# Patient Record
Sex: FEMALE | Race: WHITE | NOT HISPANIC OR LATINO | Employment: UNEMPLOYED | URBAN - NONMETROPOLITAN AREA
[De-identification: names, ages, dates, MRNs, and addresses within clinical notes are randomized per-mention and may not be internally consistent; named-entity substitution may affect disease eponyms.]

---

## 2017-07-19 ENCOUNTER — TRANSCRIBE ORDERS (OUTPATIENT)
Dept: ADMINISTRATIVE | Facility: HOSPITAL | Age: 22
End: 2017-07-19

## 2017-07-19 DIAGNOSIS — Z13.89 ENCOUNTER FOR SCREENING FOR OTHER DISORDER: Primary | ICD-10-CM

## 2018-03-19 ENCOUNTER — HOSPITAL ENCOUNTER (OUTPATIENT)
Dept: GENERAL RADIOLOGY | Facility: HOSPITAL | Age: 23
Discharge: HOME | End: 2018-03-19
Admitting: NURSE PRACTITIONER

## 2018-03-19 ENCOUNTER — TRANSCRIBE ORDERS (OUTPATIENT)
Dept: ADMINISTRATIVE | Facility: HOSPITAL | Age: 23
End: 2018-03-19

## 2018-03-19 DIAGNOSIS — S89.92XA INJURY OF LEFT LOWER EXTREMITY, INITIAL ENCOUNTER: Primary | ICD-10-CM

## 2018-03-19 PROCEDURE — 73590 X-RAY EXAM OF LOWER LEG: CPT

## 2020-01-07 ENCOUNTER — TELEPHONE (OUTPATIENT)
Dept: INTERNAL MEDICINE | Facility: CLINIC | Age: 25
End: 2020-01-07

## 2020-01-07 NOTE — TELEPHONE ENCOUNTER
Pt says her parents are Job and Katt Hernandez and are patients of Dr. Kwong.  Pt says Katt spoke to Dr. Kwong and he agreed to see pt.  If Dr. Kwong does agree to accept pt please have someone in the office set up appt as I am unable to book a new patient with Dr. Kwong.

## 2020-02-12 ENCOUNTER — OFFICE VISIT (OUTPATIENT)
Dept: INTERNAL MEDICINE | Facility: CLINIC | Age: 25
End: 2020-02-12

## 2020-02-12 VITALS
WEIGHT: 158 LBS | HEART RATE: 72 BPM | SYSTOLIC BLOOD PRESSURE: 114 MMHG | HEIGHT: 64 IN | RESPIRATION RATE: 16 BRPM | TEMPERATURE: 98.3 F | BODY MASS INDEX: 26.98 KG/M2 | DIASTOLIC BLOOD PRESSURE: 60 MMHG

## 2020-02-12 DIAGNOSIS — Z01.419 ENCOUNTER FOR WELL WOMAN EXAM WITH ROUTINE GYNECOLOGICAL EXAM: ICD-10-CM

## 2020-02-12 DIAGNOSIS — Z00.00 PHYSICAL EXAM: Primary | ICD-10-CM

## 2020-02-12 DIAGNOSIS — Z23 IMMUNIZATION DUE: ICD-10-CM

## 2020-02-12 DIAGNOSIS — L68.9 EXCESS BODY AND FACIAL HAIR: ICD-10-CM

## 2020-02-12 LAB
BILIRUB BLD-MCNC: NEGATIVE MG/DL
CLARITY, POC: CLEAR
COLOR UR: YELLOW
EXPIRATION DATE: ABNORMAL
GLUCOSE UR STRIP-MCNC: NEGATIVE MG/DL
KETONES UR QL: NEGATIVE
LEUKOCYTE EST, POC: NEGATIVE
Lab: ABNORMAL
NITRITE UR-MCNC: NEGATIVE MG/ML
PH UR: 7 [PH] (ref 5–8)
PROT UR STRIP-MCNC: NEGATIVE MG/DL
RBC # UR STRIP: ABNORMAL /UL
SP GR UR: 1 (ref 1–1.03)
UROBILINOGEN UR QL: NORMAL

## 2020-02-12 PROCEDURE — 81003 URINALYSIS AUTO W/O SCOPE: CPT | Performed by: INTERNAL MEDICINE

## 2020-02-12 PROCEDURE — 99385 PREV VISIT NEW AGE 18-39: CPT | Performed by: INTERNAL MEDICINE

## 2020-02-12 NOTE — PROGRESS NOTES
Chief Complaint   Patient presents with   • New patient, Establish care   • Annual Exam       History of Present Illness      The patient presents for an established patient physical examination and health maintenance visit.    The patient presents for a yearly gynecologic exam. She is G 0 P 0 Ab 0 SpAb 0 LC 0. She has never had a sexually transmitted disease. She denies vaginal discharge and she denies dyspareunia, dysmenorrhea or menorrhagia. She does not do monthly breast self examinations. She has not had a prior mammogram. She has not had a DEXA scan. She takes adequate Calcium and Vitamin D. She has not had a colonoscopy.    Review of Systems    CONSTITUTIONAL- Denies Unexplained Weight Loss, Fever, Chills, Sweats, Fatigue, Weakness or Malaise.    NECK- Denies Decreased Range of Motion, Stiffness, Thyroid Nodules, Enlarged Lymph Nodes or Goiter.    EYES- Denies Eye Pain, Eye Drainage, Eye Redness, Eye Discharge, Decreased Vision, Visual Disturbance, Diplopia, Visual Loss or Swollen Eyelid.    HENT- Denies Nasal Discharge, Sore Throat, Ear Pain, Ear Drainage, Headache, Sinus Pain, Nasal Congestion, Decreased Hearing or Tinnitus.    GASTROINTESTINAL- Denies Abdominal Pain, Nausea, Vomiting, Diarrhea, Blood per Rectum, Constipation or Heartburn.    PULMONARY- Denies Wheezing, Dyspnea, Sputum Production, Cough, Hemoptysis or Pleuritic Chest Pain.    CARDIOVASCULAR- Denies Chest Pain, Claudication, Edema, Syncope, Palpitations or Irregular Heart Beat.    GENITOURINARY- Denies Dysuria, Hematuria, Urinary Frequency, Urinary Leakage, Pelvic Pain or Vaginal Prolapse.    ENDOCRINE- Denies Cold Intolerance, Depression, Hair Loss, Heat Intolerance, Memory Loss, Polydypsia, Polyphagia, Polyuria, Sleep Disturbance or Weight Gain.    NEUROLOGIC- Denies Seizures, Visual Changes, Confusion or Excessive Daytime Sleepiness.    MUSCULOSKELETAL- Denies Joint Pain, Joint Stiffness, Decreased Range of Motion, Joint Swelling or  "Erythema of Joints.    INTEGUMENTARY- Denies Rash, Lumps, Sores, Itching, Dryness, Color Change, Changes in Hair, Brittle Nails, Discolored Nails, Thickened Nails, Growths or Alopecia.    Medications    No current outpatient medications on file.     Allergies    Allergies   Allergen Reactions   • Sulfa Antibiotics Unknown - Low Severity     Childhood       Problem List    There is no problem list on file for this patient.      Medications, Allergies, Problems List and Past History were reviewed and updated.    Physical Examination    /60 (BP Location: Right arm, Patient Position: Sitting, Cuff Size: Adult)   Pulse 72   Temp 98.3 °F (36.8 °C) (Temporal)   Resp 16   Ht 161.9 cm (63.75\")   Wt 71.7 kg (158 lb)   LMP 01/16/2020 (Approximate)   Breastfeeding No   BMI 27.33 kg/m²     HEENT: Head- Normocephalic Atraumatic. Facies- Within normal limits. Pinnas- Normal texture and shape bilaterally. Canals- Normal bilaterally. TMs- Normal bilaterally. Nares- Patent bilaterally. Nasal Septum- is normal. There is no tenderness to palpation over the frontal or maxillary sinuses. Lids- Normal bilaterally. Conjunctiva- Clear bilaterally. Sclera- Anicteric bilaterally. Oropharynx- Moist with no lesions. Tonsils- No enlargement, erythema or exudate.    Neck: Thyroid- non enlarged, symmetric and has no nodules. No bruits are detected. ROM- Normal Range of Motion with no rigidity.    Lungs: Auscultation- Clear to auscultation bilaterally. There are no retractions, clubbing or cyanosis. The Expiratory to Inspiratory ratio is equal.    Lymph Nodes: Cervical- no enlarged lymph nodes noted. Clavicular- no enlarged supraclavicular lymph nodes noted. Axillary- no enlarged axillary lymph nodes noted. Inguinal- no enlarged inguinal lymph nodes noted.    Cardiovascular: There are no carotid bruits. Heart- Normal Rate with Regular rhythm and no murmurs. There are no gallops. There are no rubs. In the lower extremities there is no " edema. The upper extremities do not have edema.    Abdomen: Soft, benign, non-tender with no masses, hernias, organomegaly or scars.    Breast: Normal contours bilaterally with no masses, discharge, skin changes or lumps. There are no scars noted.    Genitourinary: The labia are within normal limits with no lesions. The vaginal introitus is within normal limits. The vagina has a healthy mucosa with no lesions or discharge. The cervix is nulliparous with no lesions. The bimanual examination reveals no adnexal masses or tenderness.    Dermatologic: The patient has no worrisome or suspicious skin lesions noted.    Impression and Assessment    Normal Physical Examination.    Normal Gynecologic Exam.    Encounter for Immunization Administration.    Plan    Normal Gynecologic Exam Plan: A Pap smear was obtained.    Counseling was provided regarding: Dental Visits, Flossing Teeth and Heart Healthy Diet.    The following was ordered for screening and health maintenance: CBC w Automated Diff, CMP, Lipid Profile, TSH and U/A.    Counseled regarding immunizations and applicable VIS given.    Immunizations Ordered and Administered: Influenza.    Pat was seen today for new patient, establish care and annual exam.    Diagnoses and all orders for this visit:    Physical exam  -     Comprehensive Metabolic Panel  -     Lipid Panel  -     CBC & Differential  -     TSH  -     Vitamin D 25 Hydroxy  -     POC Urinalysis Dipstick, Automated    Encounter for well woman exam with routine gynecological exam  -     Liquid-based Pap Smear, Screening; Future    Immunization due  -     Fluarix/Fluzone/Afluria Quad>6 Months          Return to Office    The patient was instructed to return for follow-up in 1 year. Next Visit: Physical.    The patient was instructed to return sooner if the condition changes, worsens, or does not resolve.

## 2020-06-01 RX ORDER — NORETHINDRONE ACETATE AND ETHINYL ESTRADIOL .03; 1.5 MG/1; MG/1
1 TABLET ORAL DAILY
Qty: 28 EACH | Refills: 11 | Status: SHIPPED | OUTPATIENT
Start: 2020-06-01 | End: 2020-06-03 | Stop reason: SDUPTHER

## 2020-06-02 ENCOUNTER — TELEPHONE (OUTPATIENT)
Dept: INTERNAL MEDICINE | Facility: CLINIC | Age: 25
End: 2020-06-02

## 2020-06-02 NOTE — TELEPHONE ENCOUNTER
PHARMACY IS CALLING BECAUSE THE BIRTH CONTROL IS 21 DAY BUT QUANTITY SAYS 28 SO THEY NEED CLARIFICATION ON WHICH IS CORRECT  Norethindrone Acet-Ethinyl Est (Loestrin 1.5/30, 21,) 1.5-30 MG-MCG tablet    PLEASE ADVISE     GEORGIANA MARQUEZ PH: [phone]

## 2020-06-03 RX ORDER — NORETHINDRONE ACETATE AND ETHINYL ESTRADIOL .03; 1.5 MG/1; MG/1
1 TABLET ORAL DAILY
Qty: 21 EACH | Refills: 11 | Status: SHIPPED | OUTPATIENT
Start: 2020-06-03 | End: 2020-06-10 | Stop reason: SDUPTHER

## 2020-06-10 ENCOUNTER — OFFICE VISIT (OUTPATIENT)
Dept: INTERNAL MEDICINE | Facility: CLINIC | Age: 25
End: 2020-06-10

## 2020-06-10 VITALS
RESPIRATION RATE: 16 BRPM | DIASTOLIC BLOOD PRESSURE: 64 MMHG | WEIGHT: 147 LBS | HEART RATE: 72 BPM | TEMPERATURE: 98.6 F | BODY MASS INDEX: 25.43 KG/M2 | SYSTOLIC BLOOD PRESSURE: 112 MMHG

## 2020-06-10 DIAGNOSIS — N92.6 MENSTRUAL ABNORMALITY: Primary | ICD-10-CM

## 2020-06-10 PROCEDURE — 99214 OFFICE O/P EST MOD 30 MIN: CPT | Performed by: INTERNAL MEDICINE

## 2020-06-10 RX ORDER — NORETHINDRONE ACETATE AND ETHINYL ESTRADIOL .03; 1.5 MG/1; MG/1
1 TABLET ORAL DAILY
Qty: 21 EACH | Refills: 11 | Status: SHIPPED | OUTPATIENT
Start: 2020-06-10 | End: 2020-07-11 | Stop reason: SDUPTHER

## 2020-06-10 NOTE — PROGRESS NOTES
Chief Complaint   Patient presents with   • Discuss birth control and hormones       History of Present Illness    The patient presents for evaluation of abnormal uterine bleeding. Her menses are usually irregular. Her menses are now varying between long and short cycles and heavy and light bleeding.. She is not currently menstruating. The patient is sexually active by her report. She is using contraceptives.       She denies excessive hair loss, visual changes, headaches, constipation, heat intolerance, cold intolerance, nausea, breast tenderness or pelvic pain. She notes facial hair on her chin and hair on her abdomen. She has occasional acne. She denies being depressed or under increased stress. She is not on phenothiazine medications. She has never had radiation therapy in the past. She has never had a dilatation and curettage. She has never had a sexually transmitted disease, endometritis or pelvic inflammatory disease.    Review of Systems    PULMONARY- Denies Wheezing, Dyspnea, Sputum Production, Cough, Hemoptysis or Pleuritic Chest Pain.    GASTROINTESTINAL- Denies Abdominal Pain, Vomiting, Diarrhea, Blood per Rectum or Heartburn.    CARDIOVASCULAR- Denies Chest Pain, Claudication, Edema, Syncope, Palpitations or Irregular Heart Beat.    ENDOCRINE- Denies Fatigue, Memory Loss, Polydypsia, Polyphagia, Polyuria, Sleep Disturbance, Weight Gain or Weight Loss.    Medications      Current Outpatient Medications:   •  Norethindrone Acet-Ethinyl Est (Loestrin 1.5, ,) 1.5-30 MG-MCG tablet, Take 1 tablet by mouth Daily., Disp: 21 each, Rfl: 11  Not currently taking- rx .)     Allergies    Allergies   Allergen Reactions   • Sulfa Antibiotics Unknown - Low Severity     Childhood       Problem List    There is no problem list on file for this patient.      Medications, Allergies, Problems List and Past History were reviewed and updated.    Physical Examination    /64 (BP Location: Right arm, Patient  Position: Sitting, Cuff Size: Adult)   Pulse 72   Temp 98.6 °F (37 °C) (Temporal)   Resp 16   Wt 66.7 kg (147 lb)   LMP 04/20/2020 (Approximate)   Breastfeeding No   BMI 25.43 kg/m²     HEENT: Head- Normocephalic Atraumatic. Facies- Within normal limits. Pinnas- Normal texture and shape bilaterally. Canals- Normal bilaterally. TMs- Normal bilaterally. Nares- Patent bilaterally. Nasal Septum- is normal. Lids- Normal bilaterally. Conjunctiva- Clear bilaterally. Sclera- Anicteric bilaterally. Oropharynx- Moist with no lesions.    Neck: Thyroid- non enlarged, symmetric and has no nodules. No bruits are detected. ROM- Normal Range of Motion with no rigidity.    Lungs: Auscultation- Clear to auscultation bilaterally. There are no retractions, clubbing or cyanosis. The Expiratory to Inspiratory ratio is equal.    Cardiovascular: Heart- Normal Rate with Regular rhythm and no murmurs.    Abdomen: Soft, benign, non-tender with no masses, hernias, organomegaly or scars.    Impression and Assessment    Menorrhagia.    Plan    Menorrhagia Plan: Instruction regarding OCP use were given. Instructions were given regarding what to do if an OCP dose is missed. The risks, including blood clots and strokes, of using oral contraceptives with tobacco were discussed. A new rx for OCPs was provided.    Pat was seen today for discuss birth control and hormones.    Diagnoses and all orders for this visit:    Menstrual abnormality  -     Estradiol; Future  -     Luteinizing Hormone; Future  -     Follicle Stimulating Hormone; Future  -     TSH; Future  -     T4, Free; Future  -     US Non-ob Transvaginal; Future    Other orders  -     Norethindrone Acet-Ethinyl Est (Loestrin 1.5/30, 21,) 1.5-30 MG-MCG tablet; Take 1 tablet by mouth Daily.        Return to Office    The patient was instructed to return for follow-up in 1 year.    The patient was instructed to return sooner if the condition changes, worsens, or does not resolve.

## 2020-06-11 ENCOUNTER — TELEPHONE (OUTPATIENT)
Dept: INTERNAL MEDICINE | Facility: CLINIC | Age: 25
End: 2020-06-11

## 2020-06-11 NOTE — TELEPHONE ENCOUNTER
AUTUMN NEEDS CLARIFICATION FOR  Norethindrone Acet-Ethinyl Est (Loestrin 1.5/30, 21,) 1.5-30 MG-MCG tablet    AUTUMN CALL BACK [phone]

## 2020-06-11 NOTE — TELEPHONE ENCOUNTER
[facility] [phone]  Confirmed prescription should be for the 21 day Rx. Good verbal understanding.

## 2020-06-19 ENCOUNTER — HOSPITAL ENCOUNTER (OUTPATIENT)
Dept: ULTRASOUND IMAGING | Facility: HOSPITAL | Age: 25
Discharge: HOME | End: 2020-06-19
Admitting: INTERNAL MEDICINE

## 2020-06-19 DIAGNOSIS — N92.6 MENSTRUAL ABNORMALITY: ICD-10-CM

## 2020-06-19 PROCEDURE — 76830 TRANSVAGINAL US NON-OB: CPT

## 2020-07-11 RX ORDER — NORETHINDRONE ACETATE AND ETHINYL ESTRADIOL .03; 1.5 MG/1; MG/1
1 TABLET ORAL DAILY
Qty: 21 EACH | Refills: 11 | OUTPATIENT
Start: 2020-07-11 | End: 2021-01-17

## 2020-08-22 DIAGNOSIS — E66.3 OVERWEIGHT: ICD-10-CM

## 2020-08-22 DIAGNOSIS — L68.9 EXCESS BODY AND FACIAL HAIR: Primary | ICD-10-CM

## 2021-01-18 ENCOUNTER — OFFICE VISIT (OUTPATIENT)
Dept: INTERNAL MEDICINE | Facility: CLINIC | Age: 26
End: 2021-01-18

## 2021-01-18 DIAGNOSIS — N76.0 ACUTE VAGINITIS: Primary | ICD-10-CM

## 2021-01-18 DIAGNOSIS — R31.9 URINARY TRACT INFECTION WITH HEMATURIA, SITE UNSPECIFIED: ICD-10-CM

## 2021-01-18 DIAGNOSIS — N39.0 URINARY TRACT INFECTION WITH HEMATURIA, SITE UNSPECIFIED: ICD-10-CM

## 2021-01-18 PROCEDURE — 99441 PR PHYS/QHP TELEPHONE EVALUATION 5-10 MIN: CPT | Performed by: INTERNAL MEDICINE

## 2021-01-18 NOTE — PROGRESS NOTES
Chief Complaint   Patient presents with   • Vaginitis   • Urinary Tract Infection     You have chosen to receive care through a telephone visit. Do you consent to use a telephone visit for your medical care today? Yes  Total time of call:  6.5 minutes.    History of Present Illness    Patient presents for a telephone visit to discuss lab tests performed yesterday.  She was seen at the [facility] in [place] for Strep throat and was placed on augmentin.  She states that she also has vaginal discharge and dysuria.  Cultures were obtained.  Vaginal swabs were performed.  She denies fevers, chills.  Has had a few sweats.  Symptoms present for 5 days.  Denies abdominal pain.      Diagnoses and all orders for this visit:    1. Acute vaginitis (Primary)    2. Urinary tract infection with hematuria, site unspecified    Will await culture results.  Continue augmentin.  Call if changes, worsens.      Answers for HPI/ROS submitted by the patient on 1/18/2021   Female genitourinary complaint  What is the primary reason for your visit?: Vaginal Discharge/Irritation  genital itching: No  genital lesions: No  genital odor: Yes  genital rash: No  missed menses: No  pelvic pain: No  vaginal bleeding: Yes  vaginal discharge: Yes  Chronicity: new  Onset: in the past 7 days  Frequency: rarely  Progression since onset: gradually improving  Severity of pain: moderate  Affected side: both  Pregnant now?: No  abdominal pain: No  anorexia: No  back pain: Yes  chills: No  constipation: No  diarrhea: No  discolored urine: Yes  dysuria: Yes  fever: No  flank pain: No  frequency: Yes  headaches: No  hematuria: No  joint pain: No  joint swelling: Yes  nausea: Yes  painful intercourse: Yes  rash: No  sore throat: Yes  urgency: No  vomiting: No  Aggravated by: urinating  Sexual activity: sexually active  Partner with STD symptoms: no  Birth control: nothing  Menstrual history: irregular  Vaginal bleeding: typical of menses  Passing clots?: Yes  Passing  tissue?: No  Discharge characteristics: malodorous, mucoid, thick, yellow

## 2021-01-20 ENCOUNTER — TELEPHONE (OUTPATIENT)
Dept: URGENT CARE | Facility: CLINIC | Age: 26
End: 2021-01-20

## 2021-01-20 DIAGNOSIS — A54.9 GONORRHEA: Primary | ICD-10-CM

## 2021-01-20 RX ORDER — AZITHROMYCIN 500 MG/1
TABLET, FILM COATED ORAL
Qty: 2 TABLET | Refills: 0 | OUTPATIENT
Start: 2021-01-20 | End: 2021-06-16

## 2021-06-10 ENCOUNTER — TELEPHONE (OUTPATIENT)
Dept: INTERNAL MEDICINE | Facility: CLINIC | Age: 26
End: 2021-06-10

## 2021-06-10 NOTE — TELEPHONE ENCOUNTER
"LMVM for pt that we were returning her call and to please call back.  Ofc # given.     HUB:  Please inform pt that Dr Kate said  \"We can do that here as long as not on face/eyes.\"  She can schedule appt for this as long as they are not on her face/eyes.    Document if notified.   "

## 2021-06-10 NOTE — TELEPHONE ENCOUNTER
Caller: Pat Chapman    Relationship to patient: Self    Best call back number: [phone]    Patient is needing: PATIENT WAS ASKING TO SEE IF SHE COULD GET SKIN TAGS REMOVED IN THE OFFICE OR WOULD SHE HAVE TO SEE A DERMATOLOGIST FOR THAT    PLEASE ADVISE

## 2021-07-27 ENCOUNTER — TELEPHONE (OUTPATIENT)
Dept: INTERNAL MEDICINE | Facility: CLINIC | Age: 26
End: 2021-07-27

## 2021-07-27 ENCOUNTER — OFFICE VISIT (OUTPATIENT)
Dept: INTERNAL MEDICINE | Facility: CLINIC | Age: 26
End: 2021-07-27

## 2021-07-27 VITALS
DIASTOLIC BLOOD PRESSURE: 56 MMHG | TEMPERATURE: 97.8 F | BODY MASS INDEX: 23.17 KG/M2 | WEIGHT: 135 LBS | RESPIRATION RATE: 16 BRPM | HEART RATE: 84 BPM | SYSTOLIC BLOOD PRESSURE: 100 MMHG

## 2021-07-27 DIAGNOSIS — K64.9 HEMORRHOIDS, UNSPECIFIED HEMORRHOID TYPE: Primary | ICD-10-CM

## 2021-07-27 PROCEDURE — 99212 OFFICE O/P EST SF 10 MIN: CPT | Performed by: INTERNAL MEDICINE

## 2021-07-27 NOTE — TELEPHONE ENCOUNTER
Caller: Pat Chapman    Relationship to patient: Self    Best call back number:[phone]    PATIENT CALLING IN TO ASK IF SHE CAN STILL HAVE THE SKIN TAG REMOVAL TODAY? IT IS LOCATED ON THE ANUS AREA. PLEASE CALL AND ADVISE -[phone]

## 2021-07-27 NOTE — PROGRESS NOTES
Chief Complaint   Patient presents with   • SKIN TAG REMOVAL       History of Present Illness    The patient reports a chronic history of painful hemorrhoids. The hemorrhoids are not swollen. There has not been bleeding from the rectum. These factors have not given relief of the symptoms. She states that she is not constipated.    Review of Systems    GASTROINTESTINAL- Denies Abdominal Pain, Nausea, Vomiting, Diarrhea or Heartburn.    Medications      Current Outpatient Medications:   •  ondansetron (ZOFRAN) 4 MG tablet, Take 1 tablet by mouth Every 8 (Eight) Hours As Needed for Nausea or Vomiting., Disp: 12 tablet, Rfl: 0     Allergies    Allergies   Allergen Reactions   • Sulfa Antibiotics Unknown - Low Severity     Childhood       Problem List    There is no problem list on file for this patient.      Medications, Allergies, Problems List and Past History were reviewed and updated.    Physical Examination    /56 (BP Location: Right arm, Patient Position: Sitting, Cuff Size: Adult)   Pulse 84   Temp 97.8 °F (36.6 °C) (Infrared)   Resp 16   Wt 61.2 kg (135 lb)   LMP 07/13/2021 (Approximate)   Breastfeeding No   BMI 23.17 kg/m²     Rectal: reveals normal external sphincter tone with non-thrombosed external hemorrhoids.    Impression and Assessment     Hemorrhoids.    Plan    Hemorrhoids Plan: She was referred to surgery.    Diagnoses and all orders for this visit:    1. Hemorrhoids, unspecified hemorrhoid type (Primary)  -     Ambulatory Referral to Colorectal Surgery          Return to Office    The patient was instructed to return for follow-up as needed. The patient was instructed to return sooner if the condition changes, worsens, or does not resolve.

## 2021-09-29 PROCEDURE — U0004 COV-19 TEST NON-CDC HGH THRU: HCPCS | Performed by: FAMILY MEDICINE

## 2021-10-01 ENCOUNTER — TELEPHONE (OUTPATIENT)
Dept: URGENT CARE | Facility: CLINIC | Age: 26
End: 2021-10-01

## 2022-01-17 PROCEDURE — U0004 COV-19 TEST NON-CDC HGH THRU: HCPCS | Performed by: FAMILY MEDICINE

## 2022-07-06 PROCEDURE — U0004 COV-19 TEST NON-CDC HGH THRU: HCPCS | Performed by: NURSE PRACTITIONER

## 2022-07-09 ENCOUNTER — TELEPHONE (OUTPATIENT)
Dept: URGENT CARE | Facility: CLINIC | Age: 27
End: 2022-07-09

## 2022-07-09 DIAGNOSIS — J02.0 STREP THROAT: Primary | ICD-10-CM

## 2022-07-09 RX ORDER — AMOXICILLIN 875 MG/1
TABLET, COATED ORAL
Qty: 20 TABLET | Refills: 0 | OUTPATIENT
Start: 2022-07-09 | End: 2023-01-06

## 2023-09-15 DIAGNOSIS — J02.0 STREP PHARYNGITIS: Primary | ICD-10-CM

## 2023-09-15 RX ORDER — AMOXICILLIN 500 MG/1
CAPSULE ORAL
Qty: 20 CAPSULE | Refills: 0 | Status: SHIPPED | OUTPATIENT
Start: 2023-09-15

## 2024-01-17 ENCOUNTER — OFFICE VISIT (OUTPATIENT)
Dept: INTERNAL MEDICINE | Facility: CLINIC | Age: 29
End: 2024-01-17
Payer: COMMERCIAL

## 2024-01-17 VITALS
HEIGHT: 64 IN | BODY MASS INDEX: 25.44 KG/M2 | HEART RATE: 76 BPM | SYSTOLIC BLOOD PRESSURE: 110 MMHG | RESPIRATION RATE: 18 BRPM | DIASTOLIC BLOOD PRESSURE: 70 MMHG | WEIGHT: 149 LBS | TEMPERATURE: 97.5 F

## 2024-01-17 DIAGNOSIS — J02.0 STREP PHARYNGITIS: ICD-10-CM

## 2024-01-17 DIAGNOSIS — J02.9 PHARYNGITIS, UNSPECIFIED ETIOLOGY: Primary | ICD-10-CM

## 2024-01-17 PROCEDURE — 99214 OFFICE O/P EST MOD 30 MIN: CPT | Performed by: NURSE PRACTITIONER

## 2024-01-17 RX ORDER — AMOXICILLIN 875 MG/1
875 TABLET, COATED ORAL EVERY 12 HOURS SCHEDULED
Qty: 20 TABLET | Refills: 0 | Status: SHIPPED | OUTPATIENT
Start: 2024-01-17

## 2024-01-17 NOTE — PROGRESS NOTES
Chief Complaint  referral  (To ENT. )    Subjective          Pat Chapman presents to [facility]  History of Present Illness    Pat Chapman 28-year-old female who presents today as a new patient to establish care. She is a patient of Dr. Kwong.    Recurrent pharyngitis  The patient reports that throughout her entire life, she has a tendency to be susceptible to illness. She has been prone to streptococcal throat infection. She notes that she gets it at least twice a year around this time and at the end of year. The patient reports that she went to the urgent care closer to her home in the first week of 01/2024 and was tested for influenza and COVID-19 and she believes she was not tested for streptocccal infection. Although she  knew she had streptococcal infection. She was given some medications and she felt better. She usually takes amoxicillin but the medicine prescrbed was not amoxicillin, it was a standard antibiotic that started with S. It was given to alleviate her symptoms. The patient reports that this infection was worse than in the years before. She had only felt like this once before COVID-19,  when she had severe body aches, and could not concentrate. She mentions that every time [facility] she would inquire about if she needed to have tonsillectomy. She was told that for tonsillectomy to be performed there should be at least 7 to 9 positive results within a year. She reports that this past time her ears were very red and irritated, but it is resolved now. She does not experience typical allergies but makes an effort to take Claritin consistently. 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 She denies any hearing loss in her right ear.    Headaches  She used to get a lot of headaches and migraines. She has switched to new glasses in 2023 and attributes this to  improvement in her eye  "condition.    Lymph nodes  She  has noticed bumps as she gets older and was told its lymph nodes draining but she does not want it to keep progressively getting worse. She reports that she is going through an hair removal process.     Current Outpatient Medications:     loratadine (Claritin) 10 MG tablet, Take 1 tablet by mouth Daily., Disp: , Rfl:     amoxicillin (AMOXIL) 875 MG tablet, Take 1 tablet by mouth Every 12 (Twelve) Hours., Disp: 20 tablet, Rfl: 0     Objective   Vital Signs:   /70 (BP Location: Right arm, Patient Position: Sitting, Cuff Size: Adult)   Pulse 76   Temp 97.5 °F (36.4 °C) (Infrared)   Resp 18   Ht 162.6 cm (64\")   Wt 67.6 kg (149 lb)   BMI 25.58 kg/m²     Physical Exam  Vitals and nursing note reviewed.   Constitutional:       General: She is not in acute distress.     Appearance: Normal appearance. She is well-developed. She is not ill-appearing.   HENT:      Head: Normocephalic and atraumatic.      Comments: RTM with scarring.Tender in frontal sinuses.     Right Ear: Tympanic membrane is scarred.      Left Ear: Tympanic membrane is scarred.      Ears:      Comments: Bilateral tympanic membrane scarring from previous tympanostomy, right more than the left.     Nose:      Comments:   ttp frontal and thick clear pnd malodor to breath        Mouth/Throat:      Pharynx: Oropharyngeal exudate present.   Eyes:      General:         Right eye: No discharge.         Left eye: No discharge.      Conjunctiva/sclera: Conjunctivae normal.   Neck:      Thyroid: No thyromegaly.   Cardiovascular:      Rate and Rhythm: Normal rate and regular rhythm.   Pulmonary:      Effort: Pulmonary effort is normal.      Breath sounds: Normal breath sounds.   Abdominal:      General: Bowel sounds are normal. There is no distension.      Palpations: Abdomen is soft.      Tenderness: There is no abdominal tenderness.   Lymphadenopathy:      Head:      Right side of head: Tonsillar adenopathy present.      " Left side of head: Tonsillar adenopathy present.      Cervical: No cervical adenopathy.   Skin:     Capillary Refill: Capillary refill takes 2 to 3 seconds.      Coloration: Skin is not pale.   Neurological:      Mental Status: She is alert and oriented to person, place, and time.   Psychiatric:         Mood and Affect: Mood normal.         Behavior: Behavior normal.        Result Review :                 Assessment and Plan    Diagnoses and all orders for this visit:    1. Pharyngitis, unspecified etiology (Primary)  -     Ambulatory Referral to ENT (Otolaryngology)    2. Strep pharyngitis    Other orders  -     amoxicillin (AMOXIL) 875 MG tablet; Take 1 tablet by mouth Every 12 (Twelve) Hours.  Dispense: 20 tablet; Refill: 0      Chronic sinusitis  - The patient will be referred to ENT. She will inform the provider if she does not recieve a call in next 10 days.  - She will start amoxicillin 500 mg 3 times daily.    Health maintaenance  - She is advised a follow-up appointment with Dr. Kwong for regular physical, blood work and sinus scan.      Follow Up   Return in about 3 months (around 4/17/2024) for Annual, fasting.  Patient was given instructions and counseling regarding her condition or for health maintenance advice. Please see specific information pulled into the AVS if appropriate.     RTC/call  If symptoms worsen  Meds MOA and SE's reviewed and pt v/u    SHELL Frost  [facility]  [address] [address]  Fax [phone]  Phone [phone]    Transcribed from ambient dictation for SHELL Frost by Miko Edmond.  01/17/24   14:24 EST    Patient or patient representative verbalized consent to the visit recording.  I have personally performed the services described in this document as transcribed by the above individual, and it is both accurate and complete.